# Patient Record
Sex: FEMALE | Race: WHITE | Employment: OTHER | URBAN - METROPOLITAN AREA
[De-identification: names, ages, dates, MRNs, and addresses within clinical notes are randomized per-mention and may not be internally consistent; named-entity substitution may affect disease eponyms.]

---

## 2020-10-08 ENCOUNTER — TELEPHONE (OUTPATIENT)
Dept: ORTHOPEDICS CLINIC | Facility: CLINIC | Age: 85
End: 2020-10-08

## 2020-10-08 NOTE — TELEPHONE ENCOUNTER
Patient called in stating that she could not remember when she is due to follow up with Dr. Shayy Trevino. Please call back to discuss.

## 2020-10-08 NOTE — TELEPHONE ENCOUNTER
I called the patient to let her know she does not need to follow up unless she wants to pursue an injection in the left knee or surgery.

## 2020-12-14 ENCOUNTER — OFFICE VISIT (OUTPATIENT)
Dept: ORTHOPEDICS CLINIC | Facility: CLINIC | Age: 85
End: 2020-12-14
Payer: MEDICARE

## 2020-12-14 ENCOUNTER — HOSPITAL ENCOUNTER (OUTPATIENT)
Dept: GENERAL RADIOLOGY | Age: 85
Discharge: HOME | End: 2020-12-14
Attending: ORTHOPAEDIC SURGERY
Payer: MEDICARE

## 2020-12-14 DIAGNOSIS — M25.462 EFFUSION OF LEFT KNEE: ICD-10-CM

## 2020-12-14 DIAGNOSIS — M17.12 PRIMARY OSTEOARTHRITIS OF LEFT KNEE: ICD-10-CM

## 2020-12-14 DIAGNOSIS — M25.562 LEFT KNEE PAIN, UNSPECIFIED CHRONICITY: ICD-10-CM

## 2020-12-14 DIAGNOSIS — M25.562 LEFT KNEE PAIN, UNSPECIFIED CHRONICITY: Primary | ICD-10-CM

## 2020-12-14 PROCEDURE — 99213 OFFICE O/P EST LOW 20 MIN: CPT | Performed by: ORTHOPAEDIC SURGERY

## 2020-12-14 PROCEDURE — 20610 DRAIN/INJ JOINT/BURSA W/O US: CPT | Performed by: ORTHOPAEDIC SURGERY

## 2020-12-14 PROCEDURE — 73564 X-RAY EXAM KNEE 4 OR MORE: CPT | Performed by: ORTHOPAEDIC SURGERY

## 2020-12-14 RX ORDER — FUROSEMIDE 10 MG/ML
SOLUTION ORAL DAILY
COMMUNITY
End: 2021-03-03

## 2020-12-14 RX ORDER — AMLODIPINE BESYLATE 5 MG/1
5 TABLET ORAL DAILY
COMMUNITY

## 2020-12-14 NOTE — PROGRESS NOTES
[facility] New Patient Note    CC: Patient presents with:  Knee Pain: leftknee pain/ no injury      HPI: The patient is a 80year old female who presents today with complaints of left knee pain and swelling.   She has a history of osteoarthritis History      Not on file    Tobacco Use      Smoking status: Never Smoker      Smokeless tobacco: Never Used    Substance and Sexual Activity      Alcohol use: Yes        Frequency: 4 or more times a week      Drug use: Never      Sexual activity: Not on f benefits and alternatives to Zilretta injection including but not limited to needle infection, hypersensitivity reaction or failed improvement, the patient gave verbal consent to proceed.   Using meticulous sterile technique I injected 4 cc of 1% Xylocaine

## 2020-12-22 ENCOUNTER — TELEPHONE (OUTPATIENT)
Dept: ORTHOPEDICS CLINIC | Facility: CLINIC | Age: 85
End: 2020-12-22

## 2020-12-22 NOTE — TELEPHONE ENCOUNTER
Patient was in a week ago for a steroid injection in the left knee and is having a lot of pain.  Please advise

## 2021-01-07 ENCOUNTER — TELEPHONE (OUTPATIENT)
Dept: ORTHOPEDICS CLINIC | Facility: CLINIC | Age: 86
End: 2021-01-07

## 2021-03-03 ENCOUNTER — OFFICE VISIT (OUTPATIENT)
Dept: ORTHOPEDICS CLINIC | Facility: CLINIC | Age: 86
End: 2021-03-03
Payer: MEDICARE

## 2021-03-03 DIAGNOSIS — M25.462 EFFUSION OF LEFT KNEE: ICD-10-CM

## 2021-03-03 DIAGNOSIS — M17.12 PRIMARY OSTEOARTHRITIS OF LEFT KNEE: Primary | ICD-10-CM

## 2021-03-03 PROCEDURE — 99213 OFFICE O/P EST LOW 20 MIN: CPT | Performed by: ORTHOPAEDIC SURGERY

## 2021-03-03 PROCEDURE — 20610 DRAIN/INJ JOINT/BURSA W/O US: CPT | Performed by: ORTHOPAEDIC SURGERY

## 2021-03-03 RX ORDER — POTASSIUM CHLORIDE 20 MEQ/1
20 TABLET, EXTENDED RELEASE ORAL DAILY
COMMUNITY
Start: 2021-02-24

## 2021-03-03 RX ORDER — FLUTICASONE PROPIONATE 50 MCG
SPRAY, SUSPENSION (ML) NASAL
COMMUNITY
Start: 2018-04-23

## 2021-03-03 RX ORDER — DICLOFENAC SODIUM 75 MG/1
75 TABLET, DELAYED RELEASE ORAL
COMMUNITY
Start: 2021-01-19

## 2021-03-03 RX ORDER — ALBUTEROL SULFATE 90 UG/1
2 AEROSOL, METERED RESPIRATORY (INHALATION) EVERY 4 HOURS PRN
COMMUNITY
Start: 2020-09-23

## 2021-03-03 RX ORDER — CEPHALEXIN 500 MG/1
2000 CAPSULE ORAL AS NEEDED
COMMUNITY
Start: 2021-01-23

## 2021-03-03 RX ORDER — FUROSEMIDE 20 MG/1
20 TABLET ORAL 2 TIMES DAILY
COMMUNITY
Start: 2021-01-11

## 2021-03-03 RX ORDER — TRIAMCINOLONE ACETONIDE 40 MG/ML
40 INJECTION, SUSPENSION INTRA-ARTICULAR; INTRAMUSCULAR ONCE
Status: COMPLETED | OUTPATIENT
Start: 2021-03-03 | End: 2021-03-03

## 2021-03-03 RX ORDER — FLUTICASONE PROPIONATE 220 UG/1
2 AEROSOL, METERED RESPIRATORY (INHALATION) 2 TIMES DAILY
COMMUNITY
Start: 2021-02-16

## 2021-03-03 RX ADMIN — TRIAMCINOLONE ACETONIDE 40 MG: 40 INJECTION, SUSPENSION INTRA-ARTICULAR; INTRAMUSCULAR at 11:05:00

## 2021-03-03 NOTE — PROGRESS NOTES
[facility] Follow-up Progress Note      Chief Complaint: Left knee pain and swelling      History: The patient is a 68-year-old female returning due to recurrent swelling and pain of lateral left knee.   She has had a nursing facility and is a c meticulous sterile technique I injected 4 cc of 1% Xylocaine at the lateral patellofemoral joint of the left knee for anesthesia. After repeat sterile prep, I used an 18-gauge needle to aspirate 75 cc of clear straw-colored fluid.   This was followed by in

## 2021-03-31 ENCOUNTER — OFFICE VISIT (OUTPATIENT)
Dept: ORTHOPEDICS CLINIC | Facility: CLINIC | Age: 86
End: 2021-03-31
Payer: MEDICARE

## 2021-03-31 DIAGNOSIS — M17.11 PRIMARY OSTEOARTHRITIS OF RIGHT KNEE: Primary | ICD-10-CM

## 2021-03-31 DIAGNOSIS — M25.462 EFFUSION, LEFT KNEE: ICD-10-CM

## 2021-03-31 PROCEDURE — 99212 OFFICE O/P EST SF 10 MIN: CPT | Performed by: PHYSICIAN ASSISTANT

## 2021-03-31 NOTE — PROGRESS NOTES
[facility] Progress Note      Chief Complaint:  Left knee pain and swelling      Subjective: Belton Duverney is a 80year old female who is here for reevaluation of her left knee.   She saw Dr. Lindsay Rainey approximately a month ago at which time aspiration injection. She will follow-up in 2 weeks for the injection and if the swelling has not recurred. An authorization through Medicare for Synvisc 1 was submitted. She will follow-up sooner with any questions, concerns, or worsening symptoms.       Procedure

## 2021-04-01 ENCOUNTER — TELEPHONE (OUTPATIENT)
Dept: ORTHOPEDICS CLINIC | Facility: CLINIC | Age: 86
End: 2021-04-01

## 2021-04-14 ENCOUNTER — OFFICE VISIT (OUTPATIENT)
Dept: ORTHOPEDICS CLINIC | Facility: CLINIC | Age: 86
End: 2021-04-14
Payer: MEDICARE

## 2021-04-14 DIAGNOSIS — M25.462 EFFUSION OF LEFT KNEE: ICD-10-CM

## 2021-04-14 DIAGNOSIS — M17.11 PRIMARY OSTEOARTHRITIS OF RIGHT KNEE: Primary | ICD-10-CM

## 2021-04-14 PROCEDURE — 20610 DRAIN/INJ JOINT/BURSA W/O US: CPT | Performed by: ORTHOPAEDIC SURGERY

## 2021-04-14 NOTE — PROGRESS NOTES
[facility] Follow-up Progress Note      Chief Complaint: Left knee pain and swelling      History: Ms. Jessenia Jenkins is a 80-year-old female presenting with her daughter for reassessment of her left knee.   She underwent aspiration with injection of c 6 weeks.     Visco supplement Injection Procedure:  After discussing the risk benefits and alternatives to visco supplement injection including but not limited to needle infection, hypersensitivity reaction or failed improvement, the patient gave written an

## 2021-11-01 ENCOUNTER — OFFICE VISIT (OUTPATIENT)
Dept: ORTHOPEDICS CLINIC | Facility: CLINIC | Age: 86
End: 2021-11-01
Payer: MEDICARE

## 2021-11-01 VITALS — BODY MASS INDEX: 22.66 KG/M2 | WEIGHT: 136 LBS | HEIGHT: 65 IN

## 2021-11-01 DIAGNOSIS — M25.462 EFFUSION OF LEFT KNEE: ICD-10-CM

## 2021-11-01 DIAGNOSIS — M17.12 PRIMARY OSTEOARTHRITIS OF LEFT KNEE: Primary | ICD-10-CM

## 2021-11-01 PROCEDURE — 20610 DRAIN/INJ JOINT/BURSA W/O US: CPT | Performed by: ORTHOPAEDIC SURGERY

## 2021-11-01 PROCEDURE — 99213 OFFICE O/P EST LOW 20 MIN: CPT | Performed by: ORTHOPAEDIC SURGERY

## 2021-11-01 RX ORDER — INHALER, ASSIST DEVICES
SPACER (EA) MISCELLANEOUS
COMMUNITY
Start: 2021-05-06

## 2021-11-01 RX ORDER — TRIAMCINOLONE ACETONIDE 40 MG/ML
40 INJECTION, SUSPENSION INTRA-ARTICULAR; INTRAMUSCULAR ONCE
Status: COMPLETED | OUTPATIENT
Start: 2021-11-01 | End: 2021-11-01

## 2021-11-01 RX ADMIN — TRIAMCINOLONE ACETONIDE 40 MG: 40 INJECTION, SUSPENSION INTRA-ARTICULAR; INTRAMUSCULAR at 11:05:00

## 2021-11-01 NOTE — PROGRESS NOTES
[facility] Follow-up Progress Note        Chief Complaint:  Left knee pain     History:  The patient is a 80year old female who returns due to recurrent pain at the left knee.   The patient is diagnosed with osteoarthritis and has responded to including but not limited to needle infection, steroid flare or failed aspiration/improvement, the patient gave written and verbal consent to proceed.   Using meticulous sterile technique I injected 4 cc of 1% Xylocaine at the lateral patellofemoral joint o

## 2022-01-19 ENCOUNTER — TELEPHONE (OUTPATIENT)
Dept: ORTHOPEDICS CLINIC | Facility: CLINIC | Age: 87
End: 2022-01-19

## 2022-01-19 DIAGNOSIS — M17.11 PRIMARY OSTEOARTHRITIS OF RIGHT KNEE: Primary | ICD-10-CM

## 2022-01-19 DIAGNOSIS — M25.462 EFFUSION OF LEFT KNEE: ICD-10-CM

## 2022-01-19 DIAGNOSIS — M17.12 PRIMARY OSTEOARTHRITIS OF LEFT KNEE: ICD-10-CM

## 2022-01-19 NOTE — TELEPHONE ENCOUNTER
LOV: 11/1/21  Notes- Per patient a gel injection was discussed at her last office visit and she would like to proceed with it.      Discussed with patient gel injection ordering procedure, patient was advised a call will be received once insurance approval

## 2022-03-21 ENCOUNTER — OFFICE VISIT (OUTPATIENT)
Dept: ORTHOPEDICS CLINIC | Facility: CLINIC | Age: 87
End: 2022-03-21
Payer: MEDICARE

## 2022-03-21 VITALS — BODY MASS INDEX: 22.66 KG/M2 | HEIGHT: 65 IN | WEIGHT: 136 LBS

## 2022-03-21 DIAGNOSIS — M17.11 PRIMARY OSTEOARTHRITIS OF RIGHT KNEE: Primary | ICD-10-CM

## 2022-03-21 PROCEDURE — 20610 DRAIN/INJ JOINT/BURSA W/O US: CPT | Performed by: ORTHOPAEDIC SURGERY

## 2022-03-21 PROCEDURE — 99214 OFFICE O/P EST MOD 30 MIN: CPT | Performed by: ORTHOPAEDIC SURGERY

## 2022-03-21 NOTE — PROGRESS NOTES
[facility] Follow-up Progress Note        Chief Complaint:  Chronic left knee pain     History: The patient is a 80year old female who returns due to recurrent chronic pain at the left knee. The patient is diagnosed with osteoarthritis and has responded to intra-articular corticosteroid in November of last year and previous viscosupplementation. This has included Synvisc 1 administered in April 2021 which has been very effective without any reported complications. The patient is hoping for repeat administration today. Physical Exam:  On examination there is mild swelling and medial joint line tenderness with varus alignment. Mild to moderate crepitus is palpable through a arc of motion which includes slight flexion contracture and 110 degrees of flexion. Knee remains stable in all planes with no distal edema. Neurovascular status is intact. Assessment: Diagnoses and all orders for this visit:  Diagnoses and all orders for this visit:    Primary osteoarthritis of right knee  -     DRAIN/INJECT LARGE JOINT/BURSA  -     hyaluronic acid (SYNVISC-ONE) injection      Plan: I reviewed history and exam findings with the patient. The patient is struggling with recurrent pain from osteoarthritis. The patient has responded well to viscosupplementation in the past and requests repeat administration today. I feel this is reasonable and the patient understands the nature and risks. Patient also understands that if injections become ineffective over time, the best long-term solution is total knee arthroplasty. She is advanced in age but reports fairly good health. She did well following a right total knee arthroplasty performed by me in 2015. She may consider opening a discussion with her primary care physician and cardiologist to see if she would be a acceptable candidate for a very significant operation. All questions were answered and the patient verbalized understanding and appreciation.   She gives consent to proceed with viscosupplementation using Synvisc 1. The patient may follow-up as needed. Visco supplement Injection Procedure:  After discussing the risk benefits and alternatives to visco supplement injection including but not limited to needle infection, hypersensitivity reaction or failed improvement, the patient gave verbal consent to proceed. Using meticulous sterile technique I injected 4 cc of 1% Xylocaine at the lateral patellofemoral joint of the left knee for local anesthesia. After aspiration of a small amount of synovial fluid, I injected the entire contents of the Synvisc 1 syringe through an 18-gauge needle to minimal resistance. The patient tolerated this well, a Band-Aid was applied, and instructions were given to contact us with any adverse reactions. Kim Sheehan MD  [address] [place] [address] Surgery     The dictation was partially prepared using [employer] voice recognition software.   Although every attempt is made to correct errors where identified, discrepancies may still exist.

## 2022-07-11 ENCOUNTER — OFFICE VISIT (OUTPATIENT)
Dept: ORTHOPEDICS CLINIC | Facility: CLINIC | Age: 87
End: 2022-07-11
Payer: MEDICARE

## 2022-07-11 VITALS — WEIGHT: 136 LBS | BODY MASS INDEX: 22.66 KG/M2 | HEIGHT: 65 IN

## 2022-07-11 DIAGNOSIS — M17.11 PRIMARY OSTEOARTHRITIS OF RIGHT KNEE: Primary | ICD-10-CM

## 2022-07-11 DIAGNOSIS — M25.462 EFFUSION OF LEFT KNEE: ICD-10-CM

## 2022-07-11 RX ORDER — TRIAMCINOLONE ACETONIDE 40 MG/ML
40 INJECTION, SUSPENSION INTRA-ARTICULAR; INTRAMUSCULAR ONCE
Status: COMPLETED | OUTPATIENT
Start: 2022-07-11 | End: 2022-07-11

## 2022-07-11 RX ADMIN — TRIAMCINOLONE ACETONIDE 40 MG: 40 INJECTION, SUSPENSION INTRA-ARTICULAR; INTRAMUSCULAR at 10:49:00

## 2022-07-11 NOTE — PROGRESS NOTES
[facility] Follow-up Progress Note        Chief Complaint:  Left knee pain     History:  The patient is a 80year old female who returns her daughter due to recurrent pain at the left knee. The patient is diagnosed with osteoarthritis and has responded to intra-articular injections in the past.  This has been very effective without any reported complications. Her last injection was in March of this year including Synvisc 1. She does not report any significant or lasting benefit. Corticosteroid was provided in November. She is also diagnosed with declining renal function, and surgical intervention was discouraged by her primary care physician. Physical Exam:  On examination there is moderate apparent knee swelling on the left. Varus alignment noted. Mild to moderate crepitus is palpable through range of motion from near full extension to 120 degrees of flexion. Medial joint line exquisitely tender with less pain laterally. Patellofemoral joint line fairly nontender. There is no instability to stress test in all planes. No significant distal lower extremity edema is noted. Neurovascular status is intact. Assessment: Diagnoses and all orders for this visit:  Diagnoses and all orders for this visit:    Primary osteoarthritis of right knee  -     DRAIN/INJECT LARGE JOINT/BURSA  -     triamcinolone acetonide (KENALOG-40) 40 MG/ML injection 40 mg    Effusion of left knee  -     DRAIN/INJECT LARGE JOINT/BURSA  -     triamcinolone acetonide (KENALOG-40) 40 MG/ML injection 40 mg      Plan: I reviewed history and exam findings with the patient. The patient is struggling with recurrent pain from osteoarthritis. She has done well from knee replacement on the right and has considered knee replacement on this left side. We had a long discussion regarding the elevated risks of knee replacement given her advanced age. Her medical condition is also moving in the wrong direction.   We therefore all agreed that nonoperative measures should be continued. The patient has responded well to cortisone in the past and requests repeat administration today. I feel this is reasonable and the patient understands the nature and risks. The patient may follow-up as needed. Knee Aspiration Injection Procedure:  After discussing risk, benefits and alternatives to knee aspiration with injection including but not limited to needle infection, steroid flare or failed aspiration/improvement, the patient gave written and verbal consent to proceed. Using meticulous sterile technique I injected 4 cc of 1% Xylocaine at the lateral patellofemoral joint of the left knee for anesthesia. After repeat sterile prep, I used an 18-gauge needle to aspirate 30-40 cc of clear straw-colored fluid. This was followed by injection of 40 mg of Kenalog mixed with 4 cc of 1% Xylocaine through the same needle to minimal resistance. Band-Aid was applied followed by application of a compressive 6 inch Ace wrap. Instructions were given to contact us if the patient has any adverse reactions. Patient tolerated the aspiration injection well with no evidence of complications. Noreen Jerez MD  [address] [place] [address] Surgery     The dictation was partially prepared using [employer] voice recognition software.   Although every attempt is made to correct errors where identified, discrepancies may still exist.

## 2022-11-02 ENCOUNTER — TELEPHONE (OUTPATIENT)
Dept: ORTHOPEDICS CLINIC | Facility: CLINIC | Age: 87
End: 2022-11-02

## 2022-11-02 NOTE — TELEPHONE ENCOUNTER
Patient states she spoke to Dr. Emmie Coombs at her last visit about doing a PRP injection. Does Dr. Emmie Coombs do PRP and can patient be scheduled for injection if so?

## 2022-11-03 NOTE — TELEPHONE ENCOUNTER
Kartik Cisneros PA-C  He does not typically do PRP injections but we can consider Zilretta, cortisone or gel, thank you

## 2022-11-04 NOTE — TELEPHONE ENCOUNTER
2nd attempt to reach patient, left detailed voicemail with information below, advised pt to call the office with further questions or concerns

## 2022-11-14 ENCOUNTER — OFFICE VISIT (OUTPATIENT)
Dept: ORTHOPEDICS CLINIC | Facility: CLINIC | Age: 87
End: 2022-11-14
Payer: MEDICARE

## 2022-11-14 VITALS — HEIGHT: 65 IN | WEIGHT: 130 LBS | BODY MASS INDEX: 21.66 KG/M2

## 2022-11-14 DIAGNOSIS — M25.462 EFFUSION, LEFT KNEE: ICD-10-CM

## 2022-11-14 DIAGNOSIS — M17.12 PRIMARY OSTEOARTHRITIS OF LEFT KNEE: Primary | ICD-10-CM

## 2022-11-14 RX ORDER — LORATADINE 10 MG/1
10 TABLET ORAL DAILY
COMMUNITY

## 2022-11-14 RX ORDER — METHENAMINE HIPPURATE 1000 MG/1
1 TABLET ORAL 2 TIMES DAILY
COMMUNITY
Start: 2022-09-29

## 2022-11-14 RX ORDER — TRAMADOL HYDROCHLORIDE 50 MG/1
TABLET ORAL
COMMUNITY
Start: 2022-06-09

## 2022-11-14 RX ORDER — METOPROLOL SUCCINATE 25 MG/1
25 TABLET, EXTENDED RELEASE ORAL DAILY
COMMUNITY
Start: 2022-10-06

## 2022-11-14 RX ORDER — ESTRADIOL 0.1 MG/G
CREAM VAGINAL
COMMUNITY
Start: 2022-08-29

## 2022-11-14 RX ORDER — CHOLECALCIFEROL (VITAMIN D3) 1250 MCG
1 CAPSULE ORAL DAILY
COMMUNITY

## 2022-11-14 RX ORDER — TRIAMCINOLONE ACETONIDE 40 MG/ML
40 INJECTION, SUSPENSION INTRA-ARTICULAR; INTRAMUSCULAR ONCE
Status: SHIPPED | OUTPATIENT
Start: 2022-11-14

## 2022-11-14 RX ORDER — PROCHLORPERAZINE MALEATE 10 MG
10 TABLET ORAL EVERY 6 HOURS PRN
COMMUNITY
Start: 2022-08-31

## 2022-11-14 NOTE — PROGRESS NOTES
[facility] Follow-up Progress Note        Chief Complaint:  Chronic left knee pain     History:  The patient is a 80year old female who returns with her son due to recurrent pain and stiffness at the left knee. The patient is diagnosed with osteoarthritis and has responded to intra-articular injections in the past.  This has been very effective without any reported complications recently in July of this year. She also received Synvisc 1 this past spring without any noticeable benefit. . She has recently been diagnosed with declining renal function, and surgical intervention was discouraged by her primary care physician. Physical Exam:  On examination there is moderate recurrent knee swelling on the left. Varus alignment noted. Mild to moderate crepitus is palpable through range of motion from near full extension to 110 degrees of flexion. Medial joint line exquisitely tender with less pain laterally. Patellofemoral joint line fairly nontender. There is no instability to stress test in all planes. No significant distal lower extremity edema is noted. Neurovascular status is intact. Assessment: Diagnoses and all orders for this visit:  Diagnoses and all orders for this visit:    Primary osteoarthritis of left knee    Effusion, left knee      Plan: I reviewed history and exam findings with the patient. The patient is struggling with recurrent pain from osteoarthritis. She also demonstrates significant effusion on this left side which is hampering her range of motion. She has done well from knee replacement on the right and has considered knee replacement on this left side. Given the patient's advanced age and progressive medical problems, we agree that nonoperative measures should be continued. I therefore recommended that we aspirate her moderate to large effusion followed by injection of corticosteroid. She understands the nature and risks and gives written consent to proceed. If symptoms or not improving over the next few weeks reassessment is advised. Knee Aspiration Injection Procedure:  After discussing risk, benefits and alternatives to knee aspiration with injection including but not limited to needle infection, steroid flare or failed aspiration/improvement, the patient gave written and verbal consent to proceed. Using meticulous sterile technique I injected 4 cc of 1% Xylocaine at the lateral patellofemoral joint of the left knee for anesthesia. After repeat sterile prep, I used an 18-gauge needle to aspirate 40 cc of clear straw-colored fluid. This was followed by injection of 40 mg of Kenalog mixed with 4 cc of 1% Xylocaine through the same needle to minimal resistance. Band-Aid was applied followed by application of a compressive 6 inch Ace wrap. Instructions were given to contact us if the patient has any adverse reactions. Patient tolerated the aspiration injection well with no evidence of complications. Wellington Mitchell MD  [address] [place] [address] Surgery     The dictation was partially prepared using [employer] voice recognition software.   Although every attempt is made to correct errors where identified, discrepancies may still exist.

## 2023-03-29 ENCOUNTER — TELEPHONE (OUTPATIENT)
Dept: ORTHOPEDICS CLINIC | Facility: CLINIC | Age: 88
End: 2023-03-29

## 2023-03-29 NOTE — TELEPHONE ENCOUNTER
Patient is requesting a call back to discuss moving forward with a PRP injection with Dr. Emmie Coombs. Please advise.

## 2023-03-29 NOTE — TELEPHONE ENCOUNTER
Patient states she would like to move forward with PRP injection. Is patient a candidate and ready for us to schedule an apportionment? Patient question - For the 2 weeks post injection would she have to hold on topical NSAIDS? Voltaren/Aspercreme?

## 2023-04-05 NOTE — TELEPHONE ENCOUNTER
Maggie Narvaez PA-C  [facility] Clinical Pool Just now (9:47 AM)     Per Dr. Diego Suarez, ok to proceed with scheduling PRP injection please call patient to schedule thank you!

## 2023-04-06 ENCOUNTER — TELEPHONE (OUTPATIENT)
Dept: ORTHOPEDICS CLINIC | Facility: CLINIC | Age: 88
End: 2023-04-06

## 2023-04-06 NOTE — TELEPHONE ENCOUNTER
Patient called for clarification of what medication questions patient has about PRP? Previously patient asked if she could use Voltaren/Aspercreme (NSAID Creams)?

## 2023-04-06 NOTE — TELEPHONE ENCOUNTER
Patient called with questions regarding her PRP injection, specifically about her medication prior to having the injection. Please advise.      Future Appointments   Date Time Provider Seven Klein   4/19/2023  2:00 PM Debbie Haider MD [facility] [id number]

## 2023-04-11 ENCOUNTER — TELEPHONE (OUTPATIENT)
Dept: ORTHOPEDICS CLINIC | Facility: CLINIC | Age: 88
End: 2023-04-11

## 2023-04-11 NOTE — TELEPHONE ENCOUNTER
receivesd call from pts daughter, Naomi Mejia who is on hipaa consent. States pt was told no NSAIDs or tylenol before PRP procedure. Pt in \"unbearable pain\", daughter confirming that the recommendation to avoid tylenol is correct. If she cannot take tylenol, does she have other options for pain control aside from RICE? Thank you!

## 2023-04-12 NOTE — TELEPHONE ENCOUNTER
Medication Question  (Newest Message First)  View All Conversations on this Encounter  Janeth PARKS PA-C  [facility] 18 minutes ago (1:29 PM)     1. $297 will be due at the time of check in   2. Have a good meal and plenty of fluids prior to your appointment   3. Plan on being in the office 35-45min. for the entire procedure   4. Do not take any NSAIDS or Anticoagulants effective today   5. Do not take any NSAIDS for 2 weeks after procedure. This is what Brannon Knight sends patients for Dr. Lokesh Persaud PRP injections.  Ok to take Tylenol

## 2023-04-12 NOTE — TELEPHONE ENCOUNTER
Patients daughter called (per release) and educated on recommendations. 4. Do not take any NSAIDS or Anticoagulants effective today   5. Do not take any NSAIDS for 2 weeks after procedure.

## 2023-04-12 NOTE — TELEPHONE ENCOUNTER
Patient's daughter called and said that she is waiting to hear back from a nurse. Please give daughter a call back please.

## 2023-04-19 ENCOUNTER — OFFICE VISIT (OUTPATIENT)
Dept: FAMILY MEDICINE CLINIC | Facility: CLINIC | Age: 88
End: 2023-04-19
Payer: MEDICARE

## 2023-04-19 VITALS
HEART RATE: 60 BPM | DIASTOLIC BLOOD PRESSURE: 60 MMHG | OXYGEN SATURATION: 98 % | RESPIRATION RATE: 14 BRPM | SYSTOLIC BLOOD PRESSURE: 140 MMHG

## 2023-04-19 DIAGNOSIS — I49.9 IRREGULAR HEARTBEAT: Primary | ICD-10-CM

## 2023-04-19 PROCEDURE — 99212 OFFICE O/P EST SF 10 MIN: CPT | Performed by: PHYSICIAN ASSISTANT

## 2023-04-19 NOTE — PROGRESS NOTES
Roya Allen is a 80year old female who presents to [facility] with her son for low heart rate. Patient was following up with her ortho in this building and found to have a low pulse rate. Pulse was 40. Patient states she does not monitor pulse at home and not sure what her typical rate is. She feels well in office. No chest pain, SOB, weakness, headache, dizziness, nausea, vomiting, or abdominal pain. Patient is on numerous blood pressure medications. States hx of heart arhythmia but not a fib- unsure of exact diagnoses      Exam:   General: well developed. With walker. No acute distress   Neck: no carotid bruits noted   Heart: irregular rate and rhythm. No murmur   Lungs: CTA b/l   Neuro: CN 2-12 intact, normal sensation throughout, normal speech, no facial droop   Accompanied by: son   After triage, higher acuity of care was recommended to Roya Allen today. Rationale: Needs EKG, further eval   Site recommendation: ED. Patient will go to [facility] as her cardiologist is associated with hospital    Patient was offered transportation to hospital via EMS. Declined ambulance- understands risks, but continues to decline. Son will drive her directly to ED   Patient/parent verbalized understanding of rationale for further evaluation and was stable upon discharge.   Electronically signed,   Misti Monroe PA-C 4/19/2023, 3:04 PM

## 2023-05-05 ENCOUNTER — TELEPHONE (OUTPATIENT)
Dept: ORTHOPEDICS CLINIC | Facility: CLINIC | Age: 88
End: 2023-05-05

## 2023-05-05 NOTE — TELEPHONE ENCOUNTER
Patient would like to reschedule PRP. It was previously cancelled due to bradycardia. Patient has since seen the cardiologist and reports being cleared for procedure. [facility] seen and cleared per Patient. Patient requesting record be sent to office.      Next appoint for new onset shoulder pain  Future Appointments   Date Time Provider Seven Klein   5/22/2023 10:40 AM Williams Blanc MD [facility]

## 2023-05-08 NOTE — TELEPHONE ENCOUNTER
M for r/s.        Future Appointments   Date Time Provider Seven Latoya   5/22/2023 10:40 AM Daniel Kowalski MD [facility]

## 2023-05-18 ENCOUNTER — TELEPHONE (OUTPATIENT)
Dept: ORTHOPEDICS CLINIC | Facility: CLINIC | Age: 88
End: 2023-05-18

## 2023-05-19 ENCOUNTER — TELEPHONE (OUTPATIENT)
Dept: ORTHOPEDICS CLINIC | Facility: CLINIC | Age: 88
End: 2023-05-19

## 2023-05-19 DIAGNOSIS — M25.511 RIGHT SHOULDER PAIN, UNSPECIFIED CHRONICITY: Primary | ICD-10-CM

## 2023-05-19 DIAGNOSIS — M25.562 LEFT KNEE PAIN, UNSPECIFIED CHRONICITY: ICD-10-CM

## 2023-05-19 DIAGNOSIS — Z01.89 ENCOUNTER FOR LOWER EXTREMITY COMPARISON IMAGING STUDY: ICD-10-CM

## 2023-05-19 NOTE — TELEPHONE ENCOUNTER
Patient no showed for her scheduled xrays please place rx .  RT shoulder pain x LT knee pain  Future Appointments   Date Time Provider Seven Latoya   5/22/2023 10:40 AM Mark Oropeza MD [facility]

## 2023-05-22 ENCOUNTER — OFFICE VISIT (OUTPATIENT)
Dept: ORTHOPEDICS CLINIC | Facility: CLINIC | Age: 88
End: 2023-05-22
Payer: MEDICARE

## 2023-05-22 ENCOUNTER — HOSPITAL ENCOUNTER (OUTPATIENT)
Dept: GENERAL RADIOLOGY | Age: 88
Discharge: HOME | End: 2023-05-22
Attending: ORTHOPAEDIC SURGERY
Payer: MEDICARE

## 2023-05-22 DIAGNOSIS — Z01.89 ENCOUNTER FOR LOWER EXTREMITY COMPARISON IMAGING STUDY: ICD-10-CM

## 2023-05-22 DIAGNOSIS — G89.29 CHRONIC PAIN OF LEFT KNEE: ICD-10-CM

## 2023-05-22 DIAGNOSIS — M75.41 IMPINGEMENT SYNDROME OF RIGHT SHOULDER: ICD-10-CM

## 2023-05-22 DIAGNOSIS — M17.12 PRIMARY OSTEOARTHRITIS OF LEFT KNEE: Primary | ICD-10-CM

## 2023-05-22 DIAGNOSIS — M25.511 RIGHT SHOULDER PAIN, UNSPECIFIED CHRONICITY: ICD-10-CM

## 2023-05-22 DIAGNOSIS — M25.562 CHRONIC PAIN OF LEFT KNEE: ICD-10-CM

## 2023-05-22 DIAGNOSIS — M25.562 LEFT KNEE PAIN, UNSPECIFIED CHRONICITY: ICD-10-CM

## 2023-05-22 PROCEDURE — 73030 X-RAY EXAM OF SHOULDER: CPT | Performed by: ORTHOPAEDIC SURGERY

## 2023-05-22 PROCEDURE — 73564 X-RAY EXAM KNEE 4 OR MORE: CPT | Performed by: ORTHOPAEDIC SURGERY

## 2023-05-22 PROCEDURE — 73560 X-RAY EXAM OF KNEE 1 OR 2: CPT | Performed by: ORTHOPAEDIC SURGERY

## 2023-05-22 RX ORDER — HYDRALAZINE HYDROCHLORIDE 50 MG/1
50 TABLET, FILM COATED ORAL 3 TIMES DAILY
COMMUNITY
Start: 2023-02-21

## 2023-05-22 RX ORDER — TRIAMCINOLONE ACETONIDE 40 MG/ML
40 INJECTION, SUSPENSION INTRA-ARTICULAR; INTRAMUSCULAR ONCE
Status: COMPLETED | OUTPATIENT
Start: 2023-05-22 | End: 2023-05-22

## 2023-05-22 RX ADMIN — TRIAMCINOLONE ACETONIDE 40 MG: 40 INJECTION, SUSPENSION INTRA-ARTICULAR; INTRAMUSCULAR at 12:34:00

## 2023-05-22 RX ADMIN — TRIAMCINOLONE ACETONIDE 40 MG: 40 INJECTION, SUSPENSION INTRA-ARTICULAR; INTRAMUSCULAR at 12:33:00

## 2023-12-18 ENCOUNTER — OFFICE VISIT (OUTPATIENT)
Dept: ORTHOPEDICS CLINIC | Facility: CLINIC | Age: 88
End: 2023-12-18
Payer: MEDICARE

## 2023-12-18 VITALS — HEIGHT: 65 IN | WEIGHT: 130 LBS | BODY MASS INDEX: 21.66 KG/M2

## 2023-12-18 DIAGNOSIS — M17.12 PRIMARY OSTEOARTHRITIS OF LEFT KNEE: Primary | ICD-10-CM

## 2023-12-18 DIAGNOSIS — M75.41 IMPINGEMENT SYNDROME OF RIGHT SHOULDER: ICD-10-CM

## 2023-12-18 RX ORDER — TRIAMCINOLONE ACETONIDE 40 MG/ML
40 INJECTION, SUSPENSION INTRA-ARTICULAR; INTRAMUSCULAR ONCE
Status: SHIPPED | OUTPATIENT
Start: 2023-12-18

## 2023-12-19 ENCOUNTER — TELEPHONE (OUTPATIENT)
Dept: ORTHOPEDICS CLINIC | Facility: CLINIC | Age: 88
End: 2023-12-19

## 2023-12-28 NOTE — TELEPHONE ENCOUNTER
Medication received and labeled.  Please contact pt and assist with making an appt for her injection.    Please let me know which location she will go to.  Thank you!

## 2023-12-28 NOTE — TELEPHONE ENCOUNTER
Pt is scheduled at the [facility] location with Dr. Sullivan on 01/22    Future Appointments   Date Time Provider Department Center   1/22/2024  2:40 PM Felicity Sullivan MD [facility]

## 2024-01-22 ENCOUNTER — OFFICE VISIT (OUTPATIENT)
Dept: ORTHOPEDICS CLINIC | Facility: CLINIC | Age: 89
End: 2024-01-22
Payer: MEDICARE

## 2024-01-22 VITALS — BODY MASS INDEX: 21.66 KG/M2 | WEIGHT: 130 LBS | HEIGHT: 65 IN

## 2024-01-22 DIAGNOSIS — M17.12 PRIMARY OSTEOARTHRITIS OF LEFT KNEE: Primary | ICD-10-CM

## 2024-01-22 DIAGNOSIS — M25.462 EFFUSION, LEFT KNEE: ICD-10-CM

## 2024-01-23 NOTE — PROGRESS NOTES
[facility] Follow-up Progress Note        Chief Complaint: Left knee pain     History: The patient is a 89 year old female who returns due to recurrent pain at the left knee.  The patient is diagnosed with osteoarthritis and has responded to intra-articular cortisone in the past.  This has been very effective without any reported complications.  The patient is hoping for repeat administration today, although she has inquired about and been approved for alternative longer acting Zilretta injection..  There is no report of new instability, catching, locking, redness of warmth.      Past Medical History:   Diagnosis Date    Congestive heart disease (HCC)     Essential hypertension      Past Surgical History:   Procedure Laterality Date    KNEE REPLACEMENT SURGERY       Current Outpatient Medications   Medication Sig Dispense Refill    hydrALAZINE 50 MG Oral Tab Take 1 tablet (50 mg total) by mouth 3 (three) times daily.      Acetaminophen (TYLENOL ARTHRITIS PAIN OR) Take by mouth As Directed.      Cholecalciferol (VITAMIN D3) 1.25 MG (52943 UT) Oral Cap Take 1 tablet by mouth daily.      methenamine 1 g Oral Tab Take 1 tablet (1 g total) by mouth 2 (two) times daily.      estradiol 0.1 MG/GM Vaginal Cream       loratadine 10 MG Oral Tab Take 1 tablet (10 mg total) by mouth daily.      metoprolol succinate ER 25 MG Oral Tablet 24 Hr Take 1 tablet (25 mg total) by mouth daily.      prochlorperazine (COMPAZINE) 10 mg tablet Take 1 tablet (10 mg total) by mouth every 6 (six) hours as needed.      fluocinonide 0.05 % External Ointment Apply to AA's on trunk and extremities twice daily X 2 weeks or until clear, then PRN. 180 g 1    Spacer/Aero-Holding Chambers ([facility] NILAM) Does not apply Misc       furosemide 20 MG Oral Tab Take 1 tablet (20 mg total) by mouth 2 (two) times daily.      KLOR-CON M20 20 MEQ Oral Tab CR Take 1 tablet (20 mEq total) by mouth daily.      Fluticasone Propionate 50 MCG/ACT  Nasal Suspension       Albuterol Sulfate  (90 Base) MCG/ACT Inhalation Aero Soln Inhale 2 puffs into the lungs every 4 (four) hours as needed.      FLOVENT  MCG/ACT Inhalation Aerosol Inhale 2 puffs into the lungs 2 (two) times daily.      amLODIPine Besylate 5 MG Oral Tab Take 1 tablet (5 mg total) by mouth daily.      aspirin EC 81 MG Oral Tab EC Take 1 tablet (81 mg total) by mouth daily.      Azilsartan Medoxomil 80 MG Oral Tab Take 1 tablet by mouth daily.      HydrALAZINE HCl 100 MG Oral Tab Take 1 tablet (100 mg total) by mouth 3 (three) times daily.      LORazepam 0.5 MG Oral Tab as needed.      omeprazole 20 MG Oral Capsule Delayed Release Take 1 capsule (20 mg total) by mouth daily.      Verapamil HCl  MG Oral Tab CR Take 1 tablet (240 mg total) by mouth daily.      Multiple Vitamins-Minerals (ICAPS AREDS FORMULA OR) Take 1 capsule by mouth daily.      cetirizine 10 MG Oral Tab Take 1 tablet (10 mg total) by mouth daily.      Multiple Vitamin (THERA/BETA-CAROTENE) Oral Tab Take 1 tablet by mouth daily.      Cholecalciferol (VITAMIN D3 OR) Take 1 tablet by mouth daily.      traMADol 50 MG Oral Tab TAKE 1 TABLET BY MOUTH EVERY 8 (EIGHT) HOURS AS NEEDED FOR PAIN. TAKE WITH 1 TABLET OF TYLENOL. (Patient not taking: Reported on 4/19/2023)      Diclofenac Sodium 75 MG Oral Tab EC Take 1 tablet (75 mg total) by mouth. (Patient not taking: Reported on 4/19/2023)       Allergies   Allergen Reactions    Adhesive Tape OTHER (SEE COMMENTS)     reddness at site    Hydrochlorothiazide OTHER (SEE COMMENTS)     hyponatremia    Codeine NAUSEA AND VOMITING    Hyzaar NAUSEA AND VOMITING    Clonidine RASH     Rash only at the site of the patch     Family History   Problem Relation Age of Onset    Heart Disorder Mother     Cancer Son      Social History     Occupational History    Not on file   Tobacco Use    Smoking status: Never    Smokeless tobacco: Never   Vaping Use    Vaping Use: Never used    Substance and Sexual Activity    Alcohol use: Yes    Drug use: Never    Sexual activity: Not on file        ROS:  Complete ROS reviewed by me and non-contributory to the chief complaint except as mentioned above.         Physical Exam: On examination there is no apparent knee swelling or palpable warmth.  Mild to moderate crepitus is palpable through range of motion from near full extension to 110 degrees of flexion.  Mild effusion is noted.  There is no instability to stress test in all planes.  No significant distal lower extremity edema is noted.  Neurovascular status is intact.     Assessment: Diagnoses and all orders for this visit:  Diagnoses and all orders for this visit:    Primary osteoarthritis of left knee  -     DRAIN/INJECT LARGE JOINT/BURSA  -     triamcinolone acetonide (ZILRETTA) ER 32 mg injection    Effusion, left knee  -     DRAIN/INJECT LARGE JOINT/BURSA  -     triamcinolone acetonide (ZILRETTA) ER 32 mg injection         Plan: I reviewed history and exam findings with the patient.  The patient is struggling with recurrent knee pain from osteoarthritis.  The patient has responded well to cortisone in the past and would like to proceed with Ziretta sustained release triamcinolone administration today.  I feel this is reasonable and the patient understands the nature and risks.  The patient may follow-up as needed.     Knee Cortison Injection Procedure:  After discussing the risk, benefits and alternatives to Zilretta injection including but not limited to needle infection, hypersensitivity reaction or failed improvement, the patient gave verbal consent to proceed.  Using meticulous sterile technique I injected 4 cc of 1% Xylocaine for anesthesia at the lateral patellofemoral joint of the left knee.  After repeat prep, an 18-gauge needle was used to aspirate 10 to 15 cc of clear straw-colored synovial fluid.  This was followed by injection of the entire contents of the Zilretta syringe including  32 mg mixed with the diluent to minimal resistance.  The patient tolerated this well, a Band-Aid was applied, and instructions were given to contact us with any adverse reactions.     Felicity Sullivan MD  [facility]     The dictation was partially prepared using Dragon Medical voice recognition software.  Although every attempt is made to correct errors where identified, discrepancies may still exist.

## (undated) NOTE — LETTER
MADIHA Notifier: [id number]  B. Patient Name: Terry Alvarado. Identification Number: [id number]    Advance Beneficiary Notice of Non-coverage (ABN)  NOTE: If Medicare doesn't pay for D. item/service(s) below, you may have to pay. Medicare does not pay for everything, even some care that you or your health care provider have good reason to think you need. We expect Medicare may not pay for the D. item/service(s) below. Anival Paget Reason Medicare May Not Pay: F. Estimated Cost   ___Kenalog with Lidocaine and Marcaine   $320  ___Betamethasone with Lidocaine and/or Marcaine    $48  ___ Kenalog, Ketorolac, with Lidocaine and/or Marcaine   $370  ___Durolane      $1976  ___Euflexxa          $804  ___Hyalgan/Supartz  $5240  ___Orthovisc         $028  ___Synvisc   $1104  ___Synvisc One    $368  ___Monovisc         $1088  ___Zilretta        $856  ___Injection intra-articular $260  __  Medicare does not cover this service      __  Medicare may not pay for this   item/service for your condition     __  Medicare may not pay for this item/service as often as this          WHAT YOU NEED TO DO NOW:  Read this notice, so you can make an informed decision about your care. Ask us any questions that you may have after you finish reading. Choose an option below about whether to receive the D. item/service(s) listed above. Note: If you choose Option 1 or 2, we may help you to use any other insurance that you might have, but Medicare cannot require us to do this. G. OPTIONS: Check only one box. We cannot choose a box for you. OPTION 1. I want the D. item/service(s) listed above. You may ask to be paid now, but I also want Medicare billed for an official decision on payment, which is sent to me on a Medicare Summary Notice (MSN). I understand that if Medicare doesn't pay, I am responsible for payment, but I can appeal to Medicare by following the directions on the MSN.  If Medicare does pay, you will refund any payments I made to you, less co-pays or deductibles. OPTION 2. I want the D. item/service(s) listed above, but do not bill Medicare. You may ask to be paid now as I am responsible for payment. I cannot appeal if Medicare is not billed. OPTION 3. I don't want the D. item/service(s) listed above. I understand with this choice I am not responsible for payment, and I cannot appeal to see if Medicare would pay. H. Additional Information: This notice gives our opinion, not an official Medicare decision. If you have other questions on this notice or Medicare billing, call 1-800-MEDICARE ([phone]/MOM: [phone]). Signing below means that you have received and understand this notice. You may ask to receive a copy. I. Signature: J. Date:   You have the right to get Medicare information in an accessible format, like large print, Braille, or audio. You also have the right to file a complaint if you feel you've been discriminated against. Visit Medicare.gov/about- us/vikubhnzjyein-otdjnpnomfvkdfcaq-crmycv. According to the Paperwork Reduction Act of 1995, no persons are required to respond to a collection of information unless it displays a valid OMB control number. The valid OMB control number for this information collection is [id number]. The time required to complete this information collection is estimated to average 7 minutes per response, including the time to review instructions, search existing data resources, gather the data needed, and complete and review the information collection. If you have comments concerning the accuracy of the time estimate or suggestions for improving this form, please write to: CMS, [address], Attn: [address]   Form CMS-R-131 (Exp.01/31/2026) Form Approved OMB No. [id number]